# Patient Record
Sex: FEMALE | Race: WHITE | Employment: UNEMPLOYED | URBAN - METROPOLITAN AREA
[De-identification: names, ages, dates, MRNs, and addresses within clinical notes are randomized per-mention and may not be internally consistent; named-entity substitution may affect disease eponyms.]

---

## 2022-10-13 ENCOUNTER — HOSPITAL ENCOUNTER (OUTPATIENT)
Dept: MAMMOGRAPHY | Age: 47
Discharge: HOME | End: 2022-10-13

## 2023-01-05 ENCOUNTER — HOSPITAL ENCOUNTER (OUTPATIENT)
Dept: MAMMOGRAPHY | Age: 48
Discharge: HOME | End: 2023-01-05
Payer: MEDICAID

## 2023-01-05 PROCEDURE — 96040 HC GENETIC COUNSELING: CPT

## 2023-01-05 NOTE — PROGRESS NOTES
[facility]  New Visit Clinic Note      Patient Name: Jovany Cruz             YOB: 1975  MRN: [id number]  Date of Visit: 1/5/2023          Jovany Cruz was referred by *** for genetic counseling due to her personal history of *** and family history of ***.  M***. Laura George was seen in the [facility] Cancer [facility] at [facility] on  1/5/2023. Estefania Souza. Licensed Genetic Counselor, spent *** minutes collecting and reviewing personal and family medical information, providing genetic risk assessment, genetic counseling and psychosocial assessment. Clinical History: Jovany Cruz is a 49 y.o. female with ***  History of bony prominence of chest   History of pituitary macroadenoma times 2 (2003 and 2004)   History of anuerysm following resection   At age 12, has tumor removal from chest,face   Post pituitary surgery - gamma knife radiation   6'0\"   Subjectively macrocephalic, tall, larger hands     Cancer Surveillance:   Mammogram: ***  Previous breast biopsies: ***  Colonoscopy: ***  Polyps: ***  Upper endoscopy: ***  MAVIS/BSO: ***  Prostate exam: ***    Psychosocial concerns: ***    Family History by patient report:   ***    Ancestry: ***  Adventist ancestry: ***    Cancer Risk Assessment and Genetic Testing: We had a detailed discussion surrounding the concepts of hereditary, familial and sporadic cancer. Regarding the hereditary forms of cancer, autosomal dominant inheritance was reviewed. We briefly discussed potential changes in screening and management guidelines that could occur, based on genetic testing results. After reviewing the medical and family histories and risk assessment, we discussed genetic testing options. Specifically, we talked about currently available single-gene and multi-gene panel testing options, including benefits and limitations.  I explained possible results of genetic testing including positive, negative or a variant of uncertain significance, and briefly discussed the implications of each.    ***. [address] (NCCN) criteria for testing based on ***. After reviewing the medical and family histories, we discussed that CRAIG**Joss Vyas was at low risk for a hereditary predisposition to cancer. Therefore, she would not meet criteria for genetic testing at this time. Testing options and cost were still reviewed, and ALAN Vyas was given the opportunity to pursue testing.    ***The benefits of first testing an affected individual was further discussed. Specifically, the best person to begin genetic testing in a family is someone who has already had cancer, preferably the relative with the youngest age of diagnosis, as they are the most likely individual to have a mutation. We reviewed that if genetic testing is performed in an individual who has not had cancer and the test is negative, it is difficult to know if the result is negative because the individual did not inherit a family mutation or if the test is negative because the cancer in the family is actually caused by other factors that cannot be ruled out by genetic testing. If it can be confirmed that the individual did not inherit a family mutation, then the individual would be recommended to follow general population cancer screening guidelines. However, if the cancer in the family is caused by other factors, the individual should continue screening based on the family history. Therefore a negative test in an unaffected individual may provide false reassurance. Based on *** Han's family history, the most informative family member to begin testing would be ***  If a pathogenic variant is found in *** then single-site testing could be offered to other family members.  If *** would pursue testing and such testing was negative, the chance for a pathogenic variant in the family would likely be significantly reduced and thus additional testing for other family members would not be indicated. ***We realize that it is not always possible to test other family members, therefore the option of testing *** Curt Velazco was offered and recommendations would be made accordingly. We discussed insurance discrimination after genetic testing. It was reviewed that there was federal legislation known as The [employer] Non-Discrimination Act (ANNY) which went into effect in 2008. ANNY created new protections against the misuse of genetic information by health insurance companies and employers. ANNY makes it illegal for health insurance companies to deny coverage or to charge a higher rate or premium to an otherwise healthy individual based on genetic test results or family health history. ANNY applies to group health insurance, individual health insurance and Medicare. ANNY does not apply to long term care insurance, disability insurance, life insurance or insurance for [employer] personnel/[employer]. A. Beneficiaries, the [employer] and [employer] employees who receive care through the [employer]. ANNY also makes it illegal for employers to use this information when making employment decisions such as hiring, firing, promotions, or any other terms of employment. ANNY does not apply to employers with less than 15 employees. M***. Curt Velazco elected to proceed with the *** gene panel from [facility]. A saliva sample collection kit will be sent to her home from [facility]. Barring insurance or laboratory delay, results will be available 2-3 weeks after receipt of the sample in the laboratory. She will be notified of the result as soon as results are available. A follow up appointment may be scheduled to discuss further screening and management guidelines, based on these results. M***. Curt Velazco elected to proceed with the *** gene panel from [facility]. A blood sample was collected at our appointment. Barring insurance or laboratory delay, results will be available 2-3 weeks after receipt of the sample in the laboratory. She will be notified of the result as soon as results are available. A follow up appointment may be scheduled to discuss further screening and management guidelines, based on these results. CRAIG***Shavon Spangler declined genetic testing at this time. She understands that although genetic testing will not be performed, there may still be an increased risk for cancer in her family. Based on the family history, we reviewed the following screening recommendations:  Close female family members should initiate cancer screening approximately 10 years prior to the earliest diagnosis in the family or by the age of 36, whichever comes first. Women in this family should initiate *** cancer screening at the age of ***. Screening for women at an increased risk of breast cancer includes annual mammogram as well as possibly annual breast MRI  Semi-annual clinical breast exams and monthly self-breast exams are also recommended  Screening for ovarian cancer currently includes transvaginal ultrasounds and a blood test to measure CA-125 levels every 6-12 months starting at age 27, or 10 years below the earliest age of diagnosis of ovarian cancer in the family. A bilateral salpingo-oophorectomy may be considered as well for risk reduction of ovarian cancer. This possiblilty should be discussed with a physician. Colonoscopies every 3 to 5 years (or based on colonoscopy findings) beginning 10 years prior to the earliest diagnosis in the family or 36years of age, whichever comes first    Resources provided: Information about ***[facility], including billing information, and my contact information were given to Darius Short. I remain available to Darius Short and her healthcare providers should they have any questions or concerns.  I may be reached in the [address] [place] [address],[facility] at [phone] (direct) or [phone].     Sincerely,    Mary Conklin MS, [facility]  Licensed Genetic Counselor  [facility]    CC:  ***

## 2023-01-08 NOTE — PROGRESS NOTES
[facility]  New Visit Clinic Note      Patient Name: Deloris Morin             YOB: 1975  MRN: [id number]  Date of Visit: 1/5/2023          Deloris Morin was referred by Lisa Vergara MD for genetic counseling due to her personal history of pituitary macroadenoma and family history of breast and testicular cancer. Ms. Angelica Figueredo was seen in the [facility] Cancer Program at [facility] on  1/5/2023. Rodrigo Abraham, Licensed Genetic Counselor, spent 60 minutes collecting and reviewing personal and family medical information, providing genetic risk assessment, genetic counseling and psychosocial assessment. Clinical History: Deloris Morin is a 49 y.o. female with a complex medical history. 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 She was later diagnosed with pituitary macroadenoma times 2 (2003 and 2004), treated with surgical resection and gamma knife radiation. History of anuerysm following resection. 6'0\"   Subjectively macrocephalic, tall, larger hands     Cancer Surveillance:   Mammogram: yes, MRI scheduled 1/5/23  Previous breast biopsies: Yes, tumors removed  Colonoscopy: yes  Polyps:       Psychosocial concerns: Ms. Angelica Figueredo describes a striking history of overgrowth type issues which have been mentally distressing as her medical history has been complex. The discussion that an underlying genetic condition may be the contributing factor for her personal and family history was reassuring.      Family History by patient report:   Father diagnosed with breast cancer  Male maternal cousin with testicular cancer   Additional family history to be imported   Son with autism  Three children with height over 6'0\"; father 9'1\", mother 2'6\"  Skin findings in same three children with above average height  Thyroid cancer    Ancestry:    Jain ancestry:     Cancer Risk Assessment and Genetic Testing: We had a detailed discussion surrounding the concepts of hereditary, familial and sporadic cancer. Regarding the hereditary forms of cancer, autosomal dominant inheritance was reviewed. We briefly discussed potential changes in screening and management guidelines that could occur, based on genetic testing results. After reviewing the medical and family histories and risk assessment, we discussed genetic testing options. Specifically, we talked about currently available single-gene and multi-gene panel testing options, including benefits and limitations. I explained possible results of genetic testing including positive, negative or a variant of uncertain significance, and briefly discussed the implications of each. Ms. Kimmie Nieves (NCCN) criteria for testing based on personal history of pituitary macroadenoma, overgrowth suspicion, risk for PTEN or White (ANTNB3E gene). Familial cases of pituitary adenomas represent 5% of all pituitary tumors. MEN1 mutations cause multiple endocrine neoplasia type 1 (MEN1), while the White complex (CNC) is characterized by mutations in the protein kinase A regulatory subunit-1alpha (GJYII3Q) gene or changes in a locus at 2p16. Recently, a MEN1-like condition, MEN4, was found to be related to mutations in the CDKN1B gene. The clinical entity of familial isolated pituitary adenomas (FIPA) is characterized by genetic defects in the aryl hydrocarbon receptor interacting protein (AIP) gene in about 15% of all kindreds and 50% of homogenous somatotropinoma families. In addition, the family history of overgrowth, macrocephaly, and autism increases the suspicion for PTEN hamartoma tumor syndrome. This interesting family history will best be addressed by a large multigene panel including the genes of interest, the CustomNext Cancer Gene panel (91) through [facility].      We discussed insurance discrimination after genetic testing. It was reviewed that there was federal legislation known as The [employer] Non-Discrimination Act (ANNY) which went into effect in 2008. ANNY created new protections against the misuse of genetic information by health insurance companies and employers. ANNY makes it illegal for health insurance companies to deny coverage or to charge a higher rate or premium to an otherwise healthy individual based on genetic test results or family health history. ANNY applies to group health insurance, individual health insurance and Medicare. ANNY does not apply to long term care insurance, disability insurance, life insurance or insurance for [employer] personnel/[employer]. Beneficiaries, the [employer] and [employer] employees who receive care through the [employer]. ANNY also makes it illegal for employers to use this information when making employment decisions such as hiring, firing, promotions, or any other terms of employment. ANNY does not apply to employers with less than 15 employees. Ms. Maria R Keller elected to proceed with the CustomNext (80) gene panel from [facility]. A blood sample was collected at a follow up outpatient lab on 1/12/23. Barring insurance or laboratory delay, results will be available 2-3 weeks after receipt of the sample in the laboratory. She will be notified of the result as soon as results are available. A follow up appointment may be scheduled to discuss further screening and management guidelines, based on these results. Resources provided: Information about [facility], including billing information, and my contact information were given to Ms. Short. I remain available to Ms. Short and her healthcare providers should they have any questions or concerns. I may be reached in the [address] [place] [address],[facility] at [phone] (direct) or [phone].     Sincerely,    Raquel Lopez MS, [address]  Licensed Genetic Counselor  [facility]    CC:  Leonardo Armstrong MD

## 2023-04-27 ENCOUNTER — FOLLOWUP TELEPHONE ENCOUNTER (OUTPATIENT)
Dept: MAMMOGRAPHY | Age: 48
End: 2023-04-27

## 2023-04-27 NOTE — TELEPHONE ENCOUNTER
Based on our initial session, Mrs. Shannon Longoria had elected to take a blood kit with her on 1/5/2023 to pursue genetic testing through outpatient phlebotomy at [facility] in conjunction with appointments for her child. This testing has not been completed; we have made multiple attempts, listing the most recent below, to ask if Mrs. Shannon Longoria would prefer that we have a saliva kit shipped to her home. We have not been successful in connecting with the patient, but have left several messages. When sample is sent, the intention is to proceed with the CustomNext (91) gene panel from [facility].      4/13 1:50 pm - called and left a message   4/17 11:50 am - called and left a message   4/26 3:20 pm - called and left a message